# Patient Record
(demographics unavailable — no encounter records)

---

## 2020-03-02 NOTE — RAD
PA AND LATERAL VIEWS CHEST:

 

HISTORY: 

Cough.

 

FINDINGS: 

The heart size is normal.  The lungs are expanded without lobar consolidation, pneumothoraces, or ple
ural effusions.  No acute osseous abnormalities are seen.

 

IMPRESSION: 

No radiographic evidence of acute cardiopulmonary process.

 

POS: OFF